# Patient Record
Sex: MALE | Race: BLACK OR AFRICAN AMERICAN | NOT HISPANIC OR LATINO | Employment: FULL TIME | URBAN - METROPOLITAN AREA
[De-identification: names, ages, dates, MRNs, and addresses within clinical notes are randomized per-mention and may not be internally consistent; named-entity substitution may affect disease eponyms.]

---

## 2023-12-08 ENCOUNTER — OFFICE VISIT (OUTPATIENT)
Dept: CARDIOLOGY | Facility: CLINIC | Age: 43
End: 2023-12-08
Payer: COMMERCIAL

## 2023-12-08 ENCOUNTER — HOSPITAL ENCOUNTER (OUTPATIENT)
Dept: CARDIOLOGY | Facility: CLINIC | Age: 43
Discharge: HOME | End: 2023-12-08
Payer: COMMERCIAL

## 2023-12-08 VITALS
HEART RATE: 72 BPM | WEIGHT: 227 LBS | DIASTOLIC BLOOD PRESSURE: 76 MMHG | BODY MASS INDEX: 31.78 KG/M2 | OXYGEN SATURATION: 99 % | SYSTOLIC BLOOD PRESSURE: 152 MMHG | HEIGHT: 71 IN

## 2023-12-08 DIAGNOSIS — R00.1 SINUS BRADYCARDIA: ICD-10-CM

## 2023-12-08 DIAGNOSIS — R07.89 CHEST TIGHTNESS: ICD-10-CM

## 2023-12-08 DIAGNOSIS — R00.2 PALPITATIONS: ICD-10-CM

## 2023-12-08 DIAGNOSIS — I10 PRIMARY HYPERTENSION: Primary | ICD-10-CM

## 2023-12-08 DIAGNOSIS — G47.33 OBSTRUCTIVE SLEEP APNEA: ICD-10-CM

## 2023-12-08 DIAGNOSIS — E78.49 OTHER HYPERLIPIDEMIA: ICD-10-CM

## 2023-12-08 DIAGNOSIS — R06.83 SNORING: ICD-10-CM

## 2023-12-08 PROBLEM — E78.5 HYPERLIPIDEMIA: Status: ACTIVE | Noted: 2023-12-08

## 2023-12-08 PROCEDURE — 99204 OFFICE O/P NEW MOD 45 MIN: CPT | Performed by: INTERNAL MEDICINE

## 2023-12-08 PROCEDURE — 3078F DIAST BP <80 MM HG: CPT | Performed by: INTERNAL MEDICINE

## 2023-12-08 PROCEDURE — 93225 XTRNL ECG REC<48 HRS REC: CPT

## 2023-12-08 PROCEDURE — 93227 XTRNL ECG REC<48 HR R&I: CPT | Performed by: INTERNAL MEDICINE

## 2023-12-08 PROCEDURE — 3077F SYST BP >= 140 MM HG: CPT | Performed by: INTERNAL MEDICINE

## 2023-12-08 PROCEDURE — 1036F TOBACCO NON-USER: CPT | Performed by: INTERNAL MEDICINE

## 2023-12-08 ASSESSMENT — ENCOUNTER SYMPTOMS
SNORING: 1
SLEEP DISTURBANCES DUE TO BREATHING: 1
IRREGULAR HEARTBEAT: 1

## 2023-12-08 NOTE — PROGRESS NOTES
"Subjective   Marcos Dailey is a 43 y.o. male.    Chief Complaint:  Chest pain.  Bradycardia.    HPI    This is a 43-year-old man who presents with chest discomfort.  The patient describes pain across the anterior chest.  It lasts for 30 seconds or less.  He gets the discomfort 3 or 4 times per week.  the discomfort does radiate to both arms.  No radiation to the neck or jaw or to the back.  No increased shortness of breath.  Patient also complains of low heart rates with bradycardia.  Heart rates are in the 40s.  Has had no syncopal or near syncopal events.  Also notes that he snores badly.  Wakes himself up snoring.  Also on occasion wakes himself up gasping for air.    Past cardiac history is significant for elevated blood pressures.  He has been on antihypertensive medications in the past but not on a regular basis.  Past smoking history but did quit in 2016 using the Accu laser system.  Mother had heart disease and we took care of her for a number years.    Past medical history: Hospitalized in 2016 for abdominal pain unclear etiology.  No significant surgical procedures.    Allergies to medications: None known    Current medications: none    Social history: Under a lot of stress recently because of multiple family problems and he is recently undergone a divorce.  Works in sales.    Family history: We took care of his mother for many years who had cardiac problems.  Family history of hypertension.    Review of Systems   Cardiovascular:  Positive for chest pain and irregular heartbeat.   Respiratory:  Positive for sleep disturbances due to breathing and snoring.    Musculoskeletal:  Positive for arthritis.       Visit Vitals  /76 (BP Location: Left arm, Patient Position: Sitting, BP Cuff Size: Adult)   Pulse 72   Ht 1.803 m (5' 11\")   Wt 103 kg (227 lb)   SpO2 99%   BMI 31.66 kg/m²   Smoking Status Never   BSA 2.27 m²        Objective     Constitutional:       Appearance: Not in distress.   Neck:      " Vascular: JVD normal.   Pulmonary:      Breath sounds: Normal breath sounds.   Cardiovascular:      Normal rate. Regular rhythm. S1 with normal intensity. S2 with normal intensity.       Murmurs: There is no murmur.      No gallop.    Pulses:     Intact distal pulses.   Edema:     Peripheral edema absent.   Abdominal:      General: Bowel sounds are normal.   Neurological:      Mental Status: Alert and oriented to person, place and time.     Lab Review:     Assessment:    1.  Chest pain.  Unclear etiology.  Does have multiple risk factors.  Will do a stress echo study to evaluate.  To further risk stratify we will do CT calcium scoring.    2.  Bradycardia.  Will do a 48-hour Holter monitor.    3.  Obstructive sleep apnea.  Have scheduled him for a sleep study.    4.  Hypertension.  Quite hypertensive today but this is our first visit.  I suspect he ultimately will need to be on anticoagulation therapy.

## 2023-12-16 LAB — BODY SURFACE AREA: 2.27 M2

## 2024-01-01 ENCOUNTER — APPOINTMENT (OUTPATIENT)
Dept: RADIOLOGY | Facility: HOSPITAL | Age: 44
End: 2024-01-01
Payer: COMMERCIAL

## 2024-01-13 ENCOUNTER — APPOINTMENT (OUTPATIENT)
Dept: RADIOLOGY | Facility: HOSPITAL | Age: 44
End: 2024-01-13
Payer: COMMERCIAL

## 2024-01-17 ENCOUNTER — HOSPITAL ENCOUNTER (OUTPATIENT)
Dept: CARDIOLOGY | Facility: CLINIC | Age: 44
Discharge: HOME | End: 2024-01-17
Payer: COMMERCIAL

## 2024-01-17 ENCOUNTER — OFFICE VISIT (OUTPATIENT)
Dept: CARDIOLOGY | Facility: CLINIC | Age: 44
End: 2024-01-17
Payer: COMMERCIAL

## 2024-01-17 VITALS
HEART RATE: 71 BPM | OXYGEN SATURATION: 95 % | HEIGHT: 71 IN | DIASTOLIC BLOOD PRESSURE: 82 MMHG | BODY MASS INDEX: 30.8 KG/M2 | SYSTOLIC BLOOD PRESSURE: 140 MMHG | WEIGHT: 220 LBS

## 2024-01-17 DIAGNOSIS — I10 PRIMARY HYPERTENSION: ICD-10-CM

## 2024-01-17 DIAGNOSIS — R00.2 PALPITATIONS: ICD-10-CM

## 2024-01-17 DIAGNOSIS — R07.89 CHEST TIGHTNESS: ICD-10-CM

## 2024-01-17 DIAGNOSIS — I10 PRIMARY HYPERTENSION: Primary | ICD-10-CM

## 2024-01-17 DIAGNOSIS — R00.1 SINUS BRADYCARDIA: ICD-10-CM

## 2024-01-17 DIAGNOSIS — E78.49 OTHER HYPERLIPIDEMIA: ICD-10-CM

## 2024-01-17 PROCEDURE — 93018 CV STRESS TEST I&R ONLY: CPT | Performed by: INTERNAL MEDICINE

## 2024-01-17 PROCEDURE — 93350 STRESS TTE ONLY: CPT | Performed by: INTERNAL MEDICINE

## 2024-01-17 PROCEDURE — 1036F TOBACCO NON-USER: CPT | Performed by: INTERNAL MEDICINE

## 2024-01-17 PROCEDURE — 3077F SYST BP >= 140 MM HG: CPT | Performed by: INTERNAL MEDICINE

## 2024-01-17 PROCEDURE — 93016 CV STRESS TEST SUPVJ ONLY: CPT | Performed by: INTERNAL MEDICINE

## 2024-01-17 PROCEDURE — 3079F DIAST BP 80-89 MM HG: CPT | Performed by: INTERNAL MEDICINE

## 2024-01-17 PROCEDURE — 99213 OFFICE O/P EST LOW 20 MIN: CPT | Performed by: INTERNAL MEDICINE

## 2024-01-17 PROCEDURE — 93017 CV STRESS TEST TRACING ONLY: CPT

## 2024-01-17 RX ORDER — LOSARTAN POTASSIUM AND HYDROCHLOROTHIAZIDE 12.5; 1 MG/1; MG/1
1 TABLET ORAL DAILY
Qty: 90 TABLET | Refills: 3 | Status: SHIPPED | OUTPATIENT
Start: 2024-01-17 | End: 2025-01-16

## 2024-01-17 RX ORDER — AZITHROMYCIN 250 MG/1
250 TABLET, FILM COATED ORAL AS NEEDED
COMMUNITY
Start: 2023-12-11

## 2024-01-17 NOTE — PROGRESS NOTES
"Subjective   Marcos Dailey is a 43 y.o. male.    Chief Complaint:  Follow-up (1 month follow up and conselt test results)    HPI    On his last visit he presented to us with discomfort across the anterior chest.  It would last for 30 seconds or less.  He gets the discomfort about 3-4 times per week.  He has complaints of low heart rates.  Also has some complaints of sleep apnea including snoring badly and occasionally waking up gasping for air.  We ordered a stress echo study.  He is here for follow-up of the results.     Past cardiac history is significant for elevated blood pressures.  He has been on antihypertensive medications in the past but not on a regular basis.  Past smoking history but did quit in 2016 using the Accu laser system.  Mother had heart disease and we took care of her for a number years.     Past medical history: Hospitalized in 2016 for abdominal pain unclear etiology.  No significant surgical procedures.     Allergies to medications: None known     Current medications: none     Social history: Under a lot of stress recently because of multiple family problems and he is recently undergone a divorce.  Works in sales.     Family history: We took care of his mother for many years who had cardiac problems.  Family history of hypertension.     Review of Systems   Cardiovascular:  Positive for chest pain and irregular heartbeat.   Respiratory:  Positive for sleep disturbances due to breathing and snoring.    Musculoskeletal:  Positive for arthritis.       Visit Vitals  /82 (BP Location: Right arm, Patient Position: Sitting, BP Cuff Size: Adult)   Pulse 71   Ht 1.803 m (5' 11\")   Wt 99.8 kg (220 lb)   SpO2 95%   BMI 30.68 kg/m²   Smoking Status Never   BSA 2.24 m²        Objective     Constitutional:       Appearance: Not in distress.   Neck:      Vascular: JVD normal.   Pulmonary:      Breath sounds: Normal breath sounds.   Cardiovascular:      Normal rate. Regular rhythm. S1 with normal " intensity. S2 with normal intensity.       Murmurs: There is no murmur.      No gallop.    Pulses:     Intact distal pulses.   Edema:     Peripheral edema absent.   Abdominal:      General: Bowel sounds are normal.   Neurological:      Mental Status: Alert and oriented to person, place and time.         Lab Review:       Assessment:    1.  Chest pain.  The patient's stress echo study showed good exercise ability.  There were no EKG changes with exercise.  The resting and peak exercise echo images are normal.    2.  Hypertension.  The patient had a marked hypertensive blood pressure response to exercise.  Peak blood pressure was 240/130.  Needs to be on antihypertensive medications.  Will start losartan -12.5 mg daily.  We have instructed him to follow-up with Dr. Hartmann for further adjustment of his blood pressure medications.    3.  Coronary artery disease risk factors.  We have ordered a CT calcium score.  He will call us for the results.

## 2024-01-17 NOTE — PATIENT INSTRUCTIONS
For blood pressure start losartan-hydrochlorothiazide 100-12.5 mg daily.    Call us for the results of the CT calcium score.  Call us about one week after the test for the results.    We will see you back in 6 months    A alize loss program may help your blood pressure.

## 2024-01-20 ENCOUNTER — APPOINTMENT (OUTPATIENT)
Dept: SLEEP MEDICINE | Facility: CLINIC | Age: 44
End: 2024-01-20
Payer: COMMERCIAL

## 2024-02-07 ENCOUNTER — HOSPITAL ENCOUNTER (OUTPATIENT)
Dept: RADIOLOGY | Facility: HOSPITAL | Age: 44
Discharge: HOME | End: 2024-02-07
Payer: COMMERCIAL

## 2024-02-07 DIAGNOSIS — E78.49 OTHER HYPERLIPIDEMIA: ICD-10-CM

## 2024-02-07 DIAGNOSIS — I10 PRIMARY HYPERTENSION: ICD-10-CM

## 2024-02-07 PROCEDURE — 75571 CT HRT W/O DYE W/CA TEST: CPT

## 2024-02-12 ENCOUNTER — PROCEDURE VISIT (OUTPATIENT)
Dept: SLEEP MEDICINE | Facility: CLINIC | Age: 44
End: 2024-02-12
Payer: COMMERCIAL

## 2024-02-12 ENCOUNTER — OFFICE VISIT (OUTPATIENT)
Dept: CARDIOLOGY | Facility: CLINIC | Age: 44
End: 2024-02-12
Payer: COMMERCIAL

## 2024-02-12 VITALS — WEIGHT: 216 LBS | BODY MASS INDEX: 30.13 KG/M2 | DIASTOLIC BLOOD PRESSURE: 86 MMHG | SYSTOLIC BLOOD PRESSURE: 130 MMHG

## 2024-02-12 DIAGNOSIS — E78.49 OTHER HYPERLIPIDEMIA: Primary | ICD-10-CM

## 2024-02-12 DIAGNOSIS — G47.33 OBSTRUCTIVE SLEEP APNEA: ICD-10-CM

## 2024-02-12 DIAGNOSIS — I10 PRIMARY HYPERTENSION: ICD-10-CM

## 2024-02-12 DIAGNOSIS — R06.83 SNORING: ICD-10-CM

## 2024-02-12 PROBLEM — I25.10 CORONARY ARTERY DISEASE INVOLVING NATIVE CORONARY ARTERY OF NATIVE HEART WITHOUT ANGINA PECTORIS: Status: ACTIVE | Noted: 2024-02-12

## 2024-02-12 PROBLEM — R07.89 CHEST TIGHTNESS: Status: RESOLVED | Noted: 2023-12-08 | Resolved: 2024-02-12

## 2024-02-12 PROCEDURE — 3079F DIAST BP 80-89 MM HG: CPT | Performed by: INTERNAL MEDICINE

## 2024-02-12 PROCEDURE — 95806 SLEEP STUDY UNATT&RESP EFFT: CPT | Performed by: PSYCHIATRY & NEUROLOGY

## 2024-02-12 PROCEDURE — 99214 OFFICE O/P EST MOD 30 MIN: CPT | Performed by: INTERNAL MEDICINE

## 2024-02-12 PROCEDURE — 3075F SYST BP GE 130 - 139MM HG: CPT | Performed by: INTERNAL MEDICINE

## 2024-02-12 PROCEDURE — 1036F TOBACCO NON-USER: CPT | Performed by: INTERNAL MEDICINE

## 2024-02-12 RX ORDER — ROSUVASTATIN CALCIUM 20 MG/1
20 TABLET, COATED ORAL DAILY
Qty: 30 TABLET | Refills: 11 | Status: SHIPPED | OUTPATIENT
Start: 2024-02-12 | End: 2025-02-11

## 2024-02-12 NOTE — PROGRESS NOTES
Type of Study: HOME SLEEP STUDY - NOMAD     The patient received equipment and instructions for use of the [employer] [facility] Nomad HSAT device. The patient was instructed how to apply the effort belts, cannula, thermistor. It was also explained how the Nomad and oximeter components work.  The patient was asked to record their sleep for an 8-hour period.     The patient was informed of their responsibility for the device and acknowledged this by signing the HSAT device contract. The patient was asked to return the device on 2/13/2024 between the hours of 7:00 AM-5:30 PM to the [facility].     The patient was instructed to call 911 as usual for any medical- emergencies while at home.  The patient was also given a phone number for troubleshooting when using the device in case there were additional questions.

## 2024-02-12 NOTE — PATIENT INSTRUCTIONS
Start rosuvastatin 20 mg one daily for cholesterol.    This medication may cause muscle aches.  If this happens, try taking the mediation 3 days per week.

## 2024-02-12 NOTE — PROGRESS NOTES
Subjective   Marcos Dailey is a 44 y.o. male.    Chief Complaint:  Follow-up (CAC test results)    HPI    He is here for follow-up of his CT coronary calcium score.  He feels well.  His discomfort across the anterior chest has improved.  He does have some symptoms of obstructive sleep apnea.    Past cardiac history is significant for elevated blood pressures.  He has been on antihypertensive medications in the past but not on a regular basis.  Past smoking history but did quit in 2016 using the Accu laser system.  Mother had heart disease and we took care of her for a number years.     Past medical history: Hospitalized in 2016 for abdominal pain unclear etiology.  No significant surgical procedures.     Allergies to medications: None known     Current medications: none     Social history: Under a lot of stress recently because of multiple family problems and he is recently undergone a divorce.  Works in sales.     Family history: We took care of his mother for many years who had cardiac problems.  Family history of hypertension.     Review of Systems   Cardiovascular:  Positive for chest pain and irregular heartbeat.   Respiratory:  Positive for sleep disturbances due to breathing and snoring.    Musculoskeletal:  Positive for arthritis.         Visit Vitals  /86   Wt 98 kg (216 lb)   BMI 30.13 kg/m²   Smoking Status Never   BSA 2.22 m²        Objective     Constitutional:       Appearance: Not in distress.   Neck:      Vascular: JVD normal.   Pulmonary:      Breath sounds: Normal breath sounds.   Cardiovascular:      Normal rate. Regular rhythm. S1 with normal intensity. S2 with normal intensity.       Murmurs: There is no murmur.      No gallop.    Pulses:     Intact distal pulses.   Edema:     Peripheral edema absent.   Abdominal:      General: Bowel sounds are normal.   Neurological:      Mental Status: Alert and oriented to person, place and time.       Assessment:    1.  Coronary artery disease.   Based on a positive CT calcium score of 24.  This is a very high score for his age.  We feel that he needs aggressive treatment for his coronary disease.  We are going to start rosuvastatin 20 mg daily.  We have asked him to follow-up with Dr. Hartmann for further adjustment of the dose.  We did explain to his some of the potential side effects to statin therapy.    2.  Hypertension.  Blood pressures are improved.    3.  Hyperlipidemia.  Followed by Dr. Hartmann.

## 2024-02-13 PROCEDURE — 95806 SLEEP STUDY UNATT&RESP EFFT: CPT | Performed by: PSYCHIATRY & NEUROLOGY

## 2024-02-16 ASSESSMENT — SLEEP AND FATIGUE QUESTIONNAIRES
ESS-CHAD TOTAL SCORE: 2
HOW LIKELY ARE YOU TO NOD OFF OR FALL ASLEEP WHILE SITTING AND READING: WOULD NEVER DOZE
HOW LIKELY ARE YOU TO NOD OFF OR FALL ASLEEP WHILE WATCHING TV: SLIGHT CHANCE OF DOZING
HOW LIKELY ARE YOU TO NOD OFF OR FALL ASLEEP WHILE SITTING AND TALKING TO SOMEONE: WOULD NEVER DOZE
SITING INACTIVE IN A PUBLIC PLACE LIKE A CLASS ROOM OR A MOVIE THEATER: WOULD NEVER DOZE
HOW LIKELY ARE YOU TO NOD OFF OR FALL ASLEEP WHILE LYING DOWN TO REST IN THE AFTERNOON WHEN CIRCUMSTANCES PERMIT: SLIGHT CHANCE OF DOZING
HOW LIKELY ARE YOU TO NOD OFF OR FALL ASLEEP IN A CAR, WHILE STOPPED FOR A FEW MINUTES IN TRAFFIC: WOULD NEVER DOZE
HOW LIKELY ARE YOU TO NOD OFF OR FALL ASLEEP WHEN YOU ARE A PASSENGER IN A CAR FOR AN HOUR WITHOUT A BREAK: WOULD NEVER DOZE
HOW LIKELY ARE YOU TO NOD OFF OR FALL ASLEEP WHILE SITTING QUIETLY AFTER LUNCH WITHOUT ALCOHOL: WOULD NEVER DOZE

## 2024-07-17 ENCOUNTER — APPOINTMENT (OUTPATIENT)
Dept: CARDIOLOGY | Facility: CLINIC | Age: 44
End: 2024-07-17
Payer: COMMERCIAL

## 2024-07-19 NOTE — PROGRESS NOTES
"Subjective   Marcos Dailey is a 44 y.o. male.    Chief Complaint:  Follow-up sleep study.    HPI    On his last visit we started rosuvastatin therapy.  He is tolerating it well.  He is here for follow-up of the results of his sleep study.    Past cardiac history is significant for elevated blood pressures.  He has been on antihypertensive medications in the past but not on a regular basis.  Past smoking history but did quit in 2016 using the Accu laser system.  Mother had heart disease and we took care of her for a number years.     Past medical history: Hospitalized in 2016 for abdominal pain unclear etiology.  No significant surgical procedures.     Allergies to medications: None known     Current medications: none     Social history: Under a lot of stress recently because of multiple family problems and he is recently undergone a divorce.  Works in sales.     Family history: We took care of his mother for many years who had cardiac problems.  Family history of hypertension.     Review of Systems   Cardiovascular:  Positive for chest pain and irregular heartbeat.   Respiratory:  Positive for sleep disturbances due to breathing and snoring.    Musculoskeletal:  Positive for arthritis.         Current Outpatient Medications   Medication Sig Dispense Refill    azithromycin (Zithromax) 250 mg tablet Take 1 tablet (250 mg) by mouth if needed.      losartan-hydrochlorothiazide (Hyzaar) 100-12.5 mg tablet Take 1 tablet by mouth once daily. 90 tablet 3    rosuvastatin (Crestor) 20 mg tablet Take 1 tablet (20 mg) by mouth once daily. 30 tablet 11     No current facility-administered medications for this visit.        Visit Vitals  /86 (BP Location: Right arm)   Pulse 74   Ht 1.803 m (5' 11\")   Wt 102 kg (224 lb)   SpO2 97%   BMI 31.24 kg/m²   Smoking Status Never   BSA 2.26 m²        Objective     Constitutional:       Appearance: Not in distress.   Neck:      Vascular: JVD normal.   Pulmonary:      Breath sounds: " Normal breath sounds.   Cardiovascular:      Normal rate. Regular rhythm. S1 with normal intensity. S2 with normal intensity.       Murmurs: There is no murmur.      No gallop.    Pulses:     Intact distal pulses.   Edema:     Peripheral edema absent.   Abdominal:      General: Bowel sounds are normal.   Neurological:      Mental Status: Alert and oriented to person, place and time.       Assessment:    1.  Coronary disease.  Mild by CT calcium scoring but for his age this is a high score.    2.  Hyperlipidemia.  Started on steroid therapy.  Tolerating the medication.    3.  Hypertension.  Blood pressures are somewhat elevated but he just returned from a trip to [place] and gained about 8 pounds.    4.  Obstructive sleep apnea.  No further treatment recommended at this time.  Very mild sleep apnea on the basis of his sleep study.    Return to the care of Dr. Hartmann.

## 2024-07-23 ENCOUNTER — APPOINTMENT (OUTPATIENT)
Dept: CARDIOLOGY | Facility: CLINIC | Age: 44
End: 2024-07-23
Payer: COMMERCIAL

## 2024-07-23 VITALS
SYSTOLIC BLOOD PRESSURE: 136 MMHG | WEIGHT: 224 LBS | DIASTOLIC BLOOD PRESSURE: 86 MMHG | OXYGEN SATURATION: 97 % | HEIGHT: 71 IN | BODY MASS INDEX: 31.36 KG/M2 | HEART RATE: 74 BPM

## 2024-07-23 DIAGNOSIS — I10 PRIMARY HYPERTENSION: ICD-10-CM

## 2024-07-23 DIAGNOSIS — I25.10 CORONARY ARTERY DISEASE INVOLVING NATIVE CORONARY ARTERY OF NATIVE HEART WITHOUT ANGINA PECTORIS: Primary | ICD-10-CM

## 2024-07-23 DIAGNOSIS — R00.2 PALPITATIONS: ICD-10-CM

## 2024-07-23 DIAGNOSIS — E78.49 OTHER HYPERLIPIDEMIA: ICD-10-CM

## 2024-07-23 DIAGNOSIS — R00.1 SINUS BRADYCARDIA: ICD-10-CM

## 2024-07-23 DIAGNOSIS — G47.33 OBSTRUCTIVE SLEEP APNEA: ICD-10-CM

## 2024-07-23 DIAGNOSIS — F17.200 TOBACCO DEPENDENCE: ICD-10-CM

## 2024-07-23 PROCEDURE — 99213 OFFICE O/P EST LOW 20 MIN: CPT | Performed by: INTERNAL MEDICINE

## 2024-07-23 PROCEDURE — 1036F TOBACCO NON-USER: CPT | Performed by: INTERNAL MEDICINE

## 2024-07-23 PROCEDURE — 3075F SYST BP GE 130 - 139MM HG: CPT | Performed by: INTERNAL MEDICINE

## 2024-07-23 PROCEDURE — 3079F DIAST BP 80-89 MM HG: CPT | Performed by: INTERNAL MEDICINE

## 2024-07-23 PROCEDURE — 3008F BODY MASS INDEX DOCD: CPT | Performed by: INTERNAL MEDICINE

## 2024-07-23 RX ORDER — LOSARTAN POTASSIUM AND HYDROCHLOROTHIAZIDE 12.5; 1 MG/1; MG/1
1 TABLET ORAL DAILY
Qty: 90 TABLET | Refills: 3 | Status: SHIPPED | OUTPATIENT
Start: 2024-07-23 | End: 2025-07-23

## 2024-07-23 RX ORDER — ROSUVASTATIN CALCIUM 20 MG/1
20 TABLET, COATED ORAL DAILY
Qty: 90 TABLET | Refills: 3 | Status: SHIPPED | OUTPATIENT
Start: 2024-07-23 | End: 2025-07-23

## 2025-07-23 ENCOUNTER — APPOINTMENT (OUTPATIENT)
Dept: CARDIOLOGY | Facility: CLINIC | Age: 45
End: 2025-07-23
Payer: COMMERCIAL

## 2025-07-23 VITALS
DIASTOLIC BLOOD PRESSURE: 88 MMHG | OXYGEN SATURATION: 95 % | HEART RATE: 74 BPM | BODY MASS INDEX: 31.8 KG/M2 | WEIGHT: 228 LBS | SYSTOLIC BLOOD PRESSURE: 140 MMHG

## 2025-07-23 DIAGNOSIS — E78.49 OTHER HYPERLIPIDEMIA: ICD-10-CM

## 2025-07-23 DIAGNOSIS — I10 PRIMARY HYPERTENSION: ICD-10-CM

## 2025-07-23 DIAGNOSIS — R00.2 PALPITATIONS: Primary | ICD-10-CM

## 2025-07-23 DIAGNOSIS — J45.20 MILD INTERMITTENT ASTHMA, UNSPECIFIED WHETHER COMPLICATED (HHS-HCC): ICD-10-CM

## 2025-07-23 LAB
ATRIAL RATE: 68 BPM
P AXIS: 62 DEGREES
P OFFSET: 203 MS
P ONSET: 145 MS
PR INTERVAL: 154 MS
Q ONSET: 222 MS
QRS COUNT: 11 BEATS
QRS DURATION: 142 MS
QT INTERVAL: 436 MS
QTC CALCULATION(BAZETT): 463 MS
QTC FREDERICIA: 454 MS
R AXIS: 66 DEGREES
T AXIS: 12 DEGREES
T OFFSET: 440 MS
VENTRICULAR RATE: 68 BPM

## 2025-07-23 PROCEDURE — 99212 OFFICE O/P EST SF 10 MIN: CPT

## 2025-07-23 PROCEDURE — 93005 ELECTROCARDIOGRAM TRACING: CPT | Performed by: INTERNAL MEDICINE

## 2025-07-23 PROCEDURE — 1036F TOBACCO NON-USER: CPT | Performed by: INTERNAL MEDICINE

## 2025-07-23 PROCEDURE — 3079F DIAST BP 80-89 MM HG: CPT | Performed by: INTERNAL MEDICINE

## 2025-07-23 PROCEDURE — 3077F SYST BP >= 140 MM HG: CPT | Performed by: INTERNAL MEDICINE

## 2025-07-23 PROCEDURE — 99213 OFFICE O/P EST LOW 20 MIN: CPT | Performed by: INTERNAL MEDICINE

## 2025-07-23 RX ORDER — PANTOPRAZOLE SODIUM 40 MG/1
1 TABLET, DELAYED RELEASE ORAL
COMMUNITY
Start: 2025-05-15

## 2025-07-23 RX ORDER — ROSUVASTATIN CALCIUM 20 MG/1
20 TABLET, COATED ORAL DAILY
Qty: 90 TABLET | Refills: 3 | Status: SHIPPED | OUTPATIENT
Start: 2025-07-23 | End: 2026-07-23

## 2025-07-23 RX ORDER — AMLODIPINE BESYLATE 5 MG/1
5 TABLET ORAL DAILY
Qty: 90 TABLET | Refills: 3 | Status: SHIPPED | OUTPATIENT
Start: 2025-07-23 | End: 2025-07-23 | Stop reason: ALTCHOICE

## 2025-07-23 RX ORDER — ALBUTEROL SULFATE 90 UG/1
2 INHALANT RESPIRATORY (INHALATION) EVERY 6 HOURS PRN
Qty: 18 G | Refills: 5 | Status: SHIPPED | OUTPATIENT
Start: 2025-07-23 | End: 2026-07-23

## 2025-07-23 RX ORDER — LOSARTAN POTASSIUM AND HYDROCHLOROTHIAZIDE 25; 100 MG/1; MG/1
1 TABLET ORAL
Qty: 90 TABLET | Refills: 3 | Status: SHIPPED | OUTPATIENT
Start: 2025-07-23

## 2025-07-23 RX ORDER — LOSARTAN POTASSIUM AND HYDROCHLOROTHIAZIDE 25; 100 MG/1; MG/1
1 TABLET ORAL
COMMUNITY
Start: 2025-05-15 | End: 2025-07-23 | Stop reason: SDUPTHER

## 2025-07-23 RX ORDER — AMLODIPINE BESYLATE 5 MG/1
5 TABLET ORAL DAILY
Qty: 90 TABLET | Refills: 3 | Status: SHIPPED | OUTPATIENT
Start: 2025-07-23 | End: 2026-07-23

## 2025-07-23 NOTE — PROGRESS NOTES
Subjective   Marcos Dailey is a 45 y.o. male.    Chief Complaint:  Hypertension follow-up.    HPI    He feels well.  He has good energy levels.  Tolerating his medications well with no side effects to medications.    Diagnosis of coronary artery disease is based on a positive CT calcium score of 23.  This study was done on February 7, 2024.    Past cardiac history is significant for elevated blood pressures.  He has been on antihypertensive medications in the past but not on a regular basis.  Past smoking history but did quit in 2016 using the Accu laser system.  Mother had heart disease and we took care of her for a number years.     Past medical history: Hospitalized in 2016 for abdominal pain unclear etiology.  No significant surgical procedures.     Allergies to medications: None known     Current medications: none     Social history: Under a lot of stress recently because of multiple family problems and he is recently undergone a divorce.  Works in sales.     Family history: We took care of his mother for many years who had cardiac problems.  Family history of hypertension.     Review of Systems   Respiratory:  Positive for sleep disturbances due to breathing and snoring.    Musculoskeletal:  Positive for arthritis.       Current Medications[1]     Visit Vitals  /88   Pulse 74   Wt 103 kg (228 lb)   SpO2 95%   BMI 31.80 kg/m²   Smoking Status Never   BSA 2.27 m²        Objective     Constitutional:       Appearance: Not in distress.   Neck:      Vascular: JVD normal.   Pulmonary:      Breath sounds: Normal breath sounds.   Cardiovascular:      Normal rate. Regular rhythm. S1 with normal intensity. S2 with normal intensity.       Murmurs: There is no murmur.      No gallop.    Pulses:     Intact distal pulses.   Edema:     Peripheral edema absent.   Abdominal:      General: Bowel sounds are normal.   Neurological:      Mental Status: Alert and oriented to person, place and time.          Assessment:    1.  Coronary artery disease.  Mild by CT calcium scoring.  Medical management.    2.  Hyperlipidemia.  On statin therapy.  Will get labs.    3.  Hypertension.  Add amlodipine 5 mg daily.    4.  Obstructive sleep apnea.  Mild by sleep studies         [1]   Current Outpatient Medications   Medication Sig Dispense Refill    pantoprazole (ProtoNix) 40 mg EC tablet Take 1 tablet (40 mg) by mouth early in the morning..      albuterol 90 mcg/actuation inhaler Inhale 2 puffs every 6 hours if needed for wheezing. 18 g 5    amLODIPine (Norvasc) 5 mg tablet Take 1 tablet (5 mg) by mouth once daily. 90 tablet 3    losartan-hydrochlorothiazide (Hyzaar) 100-25 mg tablet Take 1 tablet by mouth early in the morning.. 90 tablet 3    rosuvastatin (Crestor) 20 mg tablet Take 1 tablet (20 mg) by mouth once daily. 90 tablet 3     No current facility-administered medications for this visit.

## 2026-07-23 ENCOUNTER — APPOINTMENT (OUTPATIENT)
Dept: CARDIOLOGY | Facility: CLINIC | Age: 46
End: 2026-07-23
Payer: COMMERCIAL